# Patient Record
Sex: MALE | Race: ASIAN | NOT HISPANIC OR LATINO
[De-identification: names, ages, dates, MRNs, and addresses within clinical notes are randomized per-mention and may not be internally consistent; named-entity substitution may affect disease eponyms.]

---

## 2017-06-19 ENCOUNTER — FORM ENCOUNTER (OUTPATIENT)
Age: 37
End: 2017-06-19

## 2017-06-20 ENCOUNTER — LABORATORY RESULT (OUTPATIENT)
Age: 37
End: 2017-06-20

## 2017-06-20 ENCOUNTER — APPOINTMENT (OUTPATIENT)
Dept: ULTRASOUND IMAGING | Facility: CLINIC | Age: 37
End: 2017-06-20

## 2017-06-20 ENCOUNTER — TRANSCRIPTION ENCOUNTER (OUTPATIENT)
Age: 37
End: 2017-06-20

## 2017-06-20 ENCOUNTER — OUTPATIENT (OUTPATIENT)
Dept: OUTPATIENT SERVICES | Facility: HOSPITAL | Age: 37
LOS: 1 days | End: 2017-06-20
Payer: COMMERCIAL

## 2017-06-20 DIAGNOSIS — B18.1 CHRONIC VIRAL HEPATITIS B WITHOUT DELTA-AGENT: ICD-10-CM

## 2017-06-20 PROCEDURE — 76700 US EXAM ABDOM COMPLETE: CPT

## 2017-06-26 ENCOUNTER — APPOINTMENT (OUTPATIENT)
Age: 37
End: 2017-06-26

## 2017-06-26 VITALS
DIASTOLIC BLOOD PRESSURE: 87 MMHG | HEART RATE: 74 BPM | TEMPERATURE: 97.7 F | WEIGHT: 196 LBS | BODY MASS INDEX: 37 KG/M2 | SYSTOLIC BLOOD PRESSURE: 138 MMHG | RESPIRATION RATE: 17 BRPM | HEIGHT: 61 IN

## 2017-06-27 LAB
HBV DNA # SERPL NAA+PROBE: 23 IU/ML
HEPB DNA PCR LOG: 1.36

## 2017-12-20 LAB
ALBUMIN SERPL ELPH-MCNC: 4 G/DL
ALP BLD-CCNC: 51 U/L
ALT SERPL-CCNC: 27 U/L
ANION GAP SERPL CALC-SCNC: 16 MMOL/L
AST SERPL-CCNC: 23 U/L
BASOPHILS # BLD AUTO: 0.03 K/UL
BASOPHILS NFR BLD AUTO: 0.4 %
BILIRUB SERPL-MCNC: 0.2 MG/DL
BUN SERPL-MCNC: 15 MG/DL
CALCIUM SERPL-MCNC: 9.2 MG/DL
CHLORIDE SERPL-SCNC: 99 MMOL/L
CO2 SERPL-SCNC: 21 MMOL/L
CREAT SERPL-MCNC: 0.96 MG/DL
EOSINOPHIL # BLD AUTO: 0.17 K/UL
EOSINOPHIL NFR BLD AUTO: 2.2 %
HBV DNA # SERPL NAA+PROBE: NOT DETECTED
HBV SURFACE AB SER QL: NONREACTIVE
HBV SURFACE AG SER QL: REACTIVE
HCT VFR BLD CALC: 42.9 %
HEPB DNA PCR LOG: NOT DETECTED LOGIU/ML
HGB BLD-MCNC: 14.6 G/DL
IMM GRANULOCYTES NFR BLD AUTO: 0.1 %
LYMPHOCYTES # BLD AUTO: 2.89 K/UL
LYMPHOCYTES NFR BLD AUTO: 36.7 %
MAN DIFF?: NORMAL
MCHC RBC-ENTMCNC: 31.1 PG
MCHC RBC-ENTMCNC: 34 GM/DL
MCV RBC AUTO: 91.5 FL
MONOCYTES # BLD AUTO: 0.44 K/UL
MONOCYTES NFR BLD AUTO: 5.6 %
NEUTROPHILS # BLD AUTO: 4.33 K/UL
NEUTROPHILS NFR BLD AUTO: 55 %
PLATELET # BLD AUTO: 225 K/UL
POTASSIUM SERPL-SCNC: 4.2 MMOL/L
PROT SERPL-MCNC: 7.3 G/DL
RBC # BLD: 4.69 M/UL
RBC # FLD: 12.5 %
SODIUM SERPL-SCNC: 136 MMOL/L
WBC # FLD AUTO: 7.87 K/UL

## 2017-12-22 ENCOUNTER — FORM ENCOUNTER (OUTPATIENT)
Age: 37
End: 2017-12-22

## 2017-12-23 ENCOUNTER — OUTPATIENT (OUTPATIENT)
Dept: OUTPATIENT SERVICES | Facility: HOSPITAL | Age: 37
LOS: 1 days | End: 2017-12-23
Payer: COMMERCIAL

## 2017-12-23 ENCOUNTER — APPOINTMENT (OUTPATIENT)
Dept: ULTRASOUND IMAGING | Facility: CLINIC | Age: 37
End: 2017-12-23

## 2017-12-23 DIAGNOSIS — B18.1 CHRONIC VIRAL HEPATITIS B WITHOUT DELTA-AGENT: ICD-10-CM

## 2017-12-23 PROCEDURE — 76700 US EXAM ABDOM COMPLETE: CPT

## 2017-12-23 PROCEDURE — 76700 US EXAM ABDOM COMPLETE: CPT | Mod: 26

## 2018-01-08 ENCOUNTER — APPOINTMENT (OUTPATIENT)
Age: 38
End: 2018-01-08
Payer: COMMERCIAL

## 2018-01-08 VITALS
TEMPERATURE: 97.5 F | HEIGHT: 71 IN | SYSTOLIC BLOOD PRESSURE: 141 MMHG | WEIGHT: 196 LBS | DIASTOLIC BLOOD PRESSURE: 84 MMHG | RESPIRATION RATE: 16 BRPM | BODY MASS INDEX: 27.44 KG/M2 | HEART RATE: 81 BPM

## 2018-01-08 PROCEDURE — 99214 OFFICE O/P EST MOD 30 MIN: CPT

## 2018-01-18 ENCOUNTER — RX RENEWAL (OUTPATIENT)
Age: 38
End: 2018-01-18

## 2018-12-17 ENCOUNTER — MEDICATION RENEWAL (OUTPATIENT)
Age: 38
End: 2018-12-17

## 2019-01-06 ENCOUNTER — FORM ENCOUNTER (OUTPATIENT)
Age: 39
End: 2019-01-06

## 2019-01-07 ENCOUNTER — APPOINTMENT (OUTPATIENT)
Dept: ULTRASOUND IMAGING | Facility: CLINIC | Age: 39
End: 2019-01-07
Payer: COMMERCIAL

## 2019-01-07 ENCOUNTER — OUTPATIENT (OUTPATIENT)
Dept: OUTPATIENT SERVICES | Facility: HOSPITAL | Age: 39
LOS: 1 days | End: 2019-01-07
Payer: COMMERCIAL

## 2019-01-07 DIAGNOSIS — B18.1 CHRONIC VIRAL HEPATITIS B WITHOUT DELTA-AGENT: ICD-10-CM

## 2019-01-07 PROCEDURE — 76700 US EXAM ABDOM COMPLETE: CPT

## 2019-01-07 PROCEDURE — 76700 US EXAM ABDOM COMPLETE: CPT | Mod: 26

## 2019-01-10 LAB
AFP-TM SERPL-MCNC: 2.6 NG/ML
ALBUMIN SERPL ELPH-MCNC: 4.5 G/DL
ALP BLD-CCNC: 53 U/L
ALT SERPL-CCNC: 40 U/L
ANION GAP SERPL CALC-SCNC: 15 MMOL/L
AST SERPL-CCNC: 26 U/L
BASOPHILS # BLD AUTO: 0.03 K/UL
BASOPHILS NFR BLD AUTO: 0.4 %
BILIRUB SERPL-MCNC: 0.3 MG/DL
BUN SERPL-MCNC: 15 MG/DL
CALCIUM SERPL-MCNC: 9.5 MG/DL
CHLORIDE SERPL-SCNC: 100 MMOL/L
CO2 SERPL-SCNC: 23 MMOL/L
CREAT SERPL-MCNC: 1.04 MG/DL
EOSINOPHIL # BLD AUTO: 0.15 K/UL
EOSINOPHIL NFR BLD AUTO: 2 %
HBV SURFACE AB SER QL: NONREACTIVE
HBV SURFACE AG SER QL: REACTIVE
HCT VFR BLD CALC: 44 %
HGB BLD-MCNC: 14.7 G/DL
IMM GRANULOCYTES NFR BLD AUTO: 0.3 %
LYMPHOCYTES # BLD AUTO: 3.44 K/UL
LYMPHOCYTES NFR BLD AUTO: 45.3 %
MAN DIFF?: NORMAL
MCHC RBC-ENTMCNC: 31 PG
MCHC RBC-ENTMCNC: 33.4 GM/DL
MCV RBC AUTO: 92.8 FL
MONOCYTES # BLD AUTO: 0.44 K/UL
MONOCYTES NFR BLD AUTO: 5.8 %
NEUTROPHILS # BLD AUTO: 3.51 K/UL
NEUTROPHILS NFR BLD AUTO: 46.2 %
PLATELET # BLD AUTO: 260 K/UL
POTASSIUM SERPL-SCNC: 4.2 MMOL/L
PROT SERPL-MCNC: 7 G/DL
RBC # BLD: 4.74 M/UL
RBC # FLD: 12.2 %
SODIUM SERPL-SCNC: 138 MMOL/L
WBC # FLD AUTO: 7.59 K/UL

## 2019-01-11 LAB
HBV DNA # SERPL NAA+PROBE: NOT DETECTED IU/ML
HEPB DNA PCR LOG: NOT DETECTED LOGIU/ML

## 2019-01-23 ENCOUNTER — APPOINTMENT (OUTPATIENT)
Dept: HEPATOLOGY | Facility: CLINIC | Age: 39
End: 2019-01-23
Payer: COMMERCIAL

## 2019-01-23 VITALS
RESPIRATION RATE: 19 BRPM | BODY MASS INDEX: 27.44 KG/M2 | HEIGHT: 71 IN | HEART RATE: 79 BPM | WEIGHT: 196 LBS | SYSTOLIC BLOOD PRESSURE: 138 MMHG | DIASTOLIC BLOOD PRESSURE: 79 MMHG | TEMPERATURE: 97.8 F

## 2019-01-23 PROCEDURE — 99214 OFFICE O/P EST MOD 30 MIN: CPT

## 2019-07-15 ENCOUNTER — FORM ENCOUNTER (OUTPATIENT)
Age: 39
End: 2019-07-15

## 2019-07-16 ENCOUNTER — APPOINTMENT (OUTPATIENT)
Dept: ULTRASOUND IMAGING | Facility: CLINIC | Age: 39
End: 2019-07-16
Payer: COMMERCIAL

## 2019-07-16 ENCOUNTER — OUTPATIENT (OUTPATIENT)
Dept: OUTPATIENT SERVICES | Facility: HOSPITAL | Age: 39
LOS: 1 days | End: 2019-07-16
Payer: COMMERCIAL

## 2019-07-16 DIAGNOSIS — B18.1 CHRONIC VIRAL HEPATITIS B WITHOUT DELTA-AGENT: ICD-10-CM

## 2019-07-16 LAB
ALBUMIN SERPL ELPH-MCNC: 4.8 G/DL
ALP BLD-CCNC: 57 U/L
ALT SERPL-CCNC: 30 U/L
ANION GAP SERPL CALC-SCNC: 10 MMOL/L
AST SERPL-CCNC: 19 U/L
BASOPHILS # BLD AUTO: 0.05 K/UL
BASOPHILS NFR BLD AUTO: 0.8 %
BILIRUB SERPL-MCNC: 0.5 MG/DL
BUN SERPL-MCNC: 14 MG/DL
CALCIUM SERPL-MCNC: 10 MG/DL
CHLORIDE SERPL-SCNC: 102 MMOL/L
CO2 SERPL-SCNC: 27 MMOL/L
CREAT SERPL-MCNC: 1.08 MG/DL
EOSINOPHIL # BLD AUTO: 0.26 K/UL
EOSINOPHIL NFR BLD AUTO: 4.3 %
HCT VFR BLD CALC: 47.6 %
HGB BLD-MCNC: 15.9 G/DL
IMM GRANULOCYTES NFR BLD AUTO: 0.3 %
LYMPHOCYTES # BLD AUTO: 2.69 K/UL
LYMPHOCYTES NFR BLD AUTO: 44.7 %
MAN DIFF?: NORMAL
MCHC RBC-ENTMCNC: 30.9 PG
MCHC RBC-ENTMCNC: 33.4 GM/DL
MCV RBC AUTO: 92.6 FL
MONOCYTES # BLD AUTO: 0.44 K/UL
MONOCYTES NFR BLD AUTO: 7.3 %
NEUTROPHILS # BLD AUTO: 2.56 K/UL
NEUTROPHILS NFR BLD AUTO: 42.6 %
PLATELET # BLD AUTO: 230 K/UL
POTASSIUM SERPL-SCNC: 4.6 MMOL/L
PROT SERPL-MCNC: 7.4 G/DL
RBC # BLD: 5.14 M/UL
RBC # FLD: 11.9 %
SODIUM SERPL-SCNC: 139 MMOL/L
WBC # FLD AUTO: 6.02 K/UL

## 2019-07-16 PROCEDURE — 76700 US EXAM ABDOM COMPLETE: CPT

## 2019-07-16 PROCEDURE — 76700 US EXAM ABDOM COMPLETE: CPT | Mod: 26

## 2019-07-17 LAB
HBV SURFACE AB SER QL: NONREACTIVE
HBV SURFACE AG SER QL: REACTIVE

## 2019-07-18 LAB
HBV DNA # SERPL NAA+PROBE: NOT DETECTED IU/ML
HEPB DNA PCR LOG: NOT DETECTED LOGIU/ML

## 2019-07-22 ENCOUNTER — APPOINTMENT (OUTPATIENT)
Dept: HEPATOLOGY | Facility: CLINIC | Age: 39
End: 2019-07-22
Payer: COMMERCIAL

## 2019-07-22 VITALS
HEIGHT: 71 IN | BODY MASS INDEX: 27.3 KG/M2 | RESPIRATION RATE: 19 BRPM | DIASTOLIC BLOOD PRESSURE: 80 MMHG | HEART RATE: 86 BPM | TEMPERATURE: 98 F | SYSTOLIC BLOOD PRESSURE: 116 MMHG | WEIGHT: 195 LBS

## 2019-07-22 VITALS
HEIGHT: 71 IN | SYSTOLIC BLOOD PRESSURE: 116 MMHG | BODY MASS INDEX: 27.3 KG/M2 | TEMPERATURE: 98 F | RESPIRATION RATE: 19 BRPM | WEIGHT: 195 LBS | DIASTOLIC BLOOD PRESSURE: 80 MMHG | HEART RATE: 86 BPM

## 2019-07-22 PROCEDURE — 99214 OFFICE O/P EST MOD 30 MIN: CPT | Mod: 25

## 2019-07-22 PROCEDURE — 91200 LIVER ELASTOGRAPHY: CPT

## 2019-07-22 PROCEDURE — ZZZZZ: CPT

## 2020-02-13 ENCOUNTER — APPOINTMENT (OUTPATIENT)
Dept: HEPATOLOGY | Facility: CLINIC | Age: 40
End: 2020-02-13

## 2021-02-04 ENCOUNTER — OUTPATIENT (OUTPATIENT)
Dept: OUTPATIENT SERVICES | Facility: HOSPITAL | Age: 41
LOS: 1 days | End: 2021-02-04
Payer: COMMERCIAL

## 2021-02-04 ENCOUNTER — APPOINTMENT (OUTPATIENT)
Dept: ULTRASOUND IMAGING | Facility: CLINIC | Age: 41
End: 2021-02-04
Payer: COMMERCIAL

## 2021-02-04 DIAGNOSIS — B19.10 UNSPECIFIED VIRAL HEPATITIS B WITHOUT HEPATIC COMA: ICD-10-CM

## 2021-02-04 PROCEDURE — 76700 US EXAM ABDOM COMPLETE: CPT | Mod: 26

## 2021-02-04 PROCEDURE — 76700 US EXAM ABDOM COMPLETE: CPT

## 2021-02-09 LAB
AFP-TM SERPL-MCNC: 2.1 NG/ML
ALBUMIN SERPL ELPH-MCNC: 4.6 G/DL
ALP BLD-CCNC: 64 U/L
ALT SERPL-CCNC: 32 U/L
ANION GAP SERPL CALC-SCNC: 12 MMOL/L
AST SERPL-CCNC: 23 U/L
BASOPHILS # BLD AUTO: 0.05 K/UL
BASOPHILS NFR BLD AUTO: 0.8 %
BILIRUB SERPL-MCNC: 0.3 MG/DL
BUN SERPL-MCNC: 14 MG/DL
CALCIUM SERPL-MCNC: 9.9 MG/DL
CHLORIDE SERPL-SCNC: 101 MMOL/L
CO2 SERPL-SCNC: 25 MMOL/L
CREAT SERPL-MCNC: 1 MG/DL
EOSINOPHIL # BLD AUTO: 0.1 K/UL
EOSINOPHIL NFR BLD AUTO: 1.5 %
HBV SURFACE AB SER QL: NONREACTIVE
HBV SURFACE AG SER QL: REACTIVE
HCT VFR BLD CALC: 46.1 %
HGB BLD-MCNC: 15.7 G/DL
IMM GRANULOCYTES NFR BLD AUTO: 0.2 %
LYMPHOCYTES # BLD AUTO: 2.75 K/UL
LYMPHOCYTES NFR BLD AUTO: 42.3 %
MAN DIFF?: NORMAL
MCHC RBC-ENTMCNC: 30.9 PG
MCHC RBC-ENTMCNC: 34.1 GM/DL
MCV RBC AUTO: 90.7 FL
MONOCYTES # BLD AUTO: 0.49 K/UL
MONOCYTES NFR BLD AUTO: 7.5 %
NEUTROPHILS # BLD AUTO: 3.1 K/UL
NEUTROPHILS NFR BLD AUTO: 47.7 %
PLATELET # BLD AUTO: 252 K/UL
POTASSIUM SERPL-SCNC: 4 MMOL/L
PROT SERPL-MCNC: 7.4 G/DL
RBC # BLD: 5.08 M/UL
RBC # FLD: 11.8 %
SODIUM SERPL-SCNC: 138 MMOL/L
WBC # FLD AUTO: 6.5 K/UL

## 2021-02-10 LAB
HBV DNA # SERPL NAA+PROBE: NORMAL IU/ML
HBV E AB SER QL: NEGATIVE
HEPB DNA PCR LOG: ABNORMAL LOG10IU/ML

## 2021-02-11 LAB — HBV E AG SER QL: POSITIVE

## 2021-02-12 ENCOUNTER — APPOINTMENT (OUTPATIENT)
Dept: HEPATOLOGY | Facility: CLINIC | Age: 41
End: 2021-02-12
Payer: COMMERCIAL

## 2021-02-12 VITALS
TEMPERATURE: 97 F | OXYGEN SATURATION: 100 % | HEIGHT: 71 IN | WEIGHT: 192 LBS | RESPIRATION RATE: 12 BRPM | BODY MASS INDEX: 26.88 KG/M2 | DIASTOLIC BLOOD PRESSURE: 71 MMHG | SYSTOLIC BLOOD PRESSURE: 103 MMHG | HEART RATE: 80 BPM

## 2021-02-12 PROCEDURE — 99072 ADDL SUPL MATRL&STAF TM PHE: CPT

## 2021-02-12 PROCEDURE — 99214 OFFICE O/P EST MOD 30 MIN: CPT

## 2021-07-19 ENCOUNTER — TRANSCRIPTION ENCOUNTER (OUTPATIENT)
Age: 41
End: 2021-07-19

## 2021-09-03 ENCOUNTER — APPOINTMENT (OUTPATIENT)
Dept: ULTRASOUND IMAGING | Facility: CLINIC | Age: 41
End: 2021-09-03
Payer: COMMERCIAL

## 2021-09-03 ENCOUNTER — OUTPATIENT (OUTPATIENT)
Dept: OUTPATIENT SERVICES | Facility: HOSPITAL | Age: 41
LOS: 1 days | End: 2021-09-03
Payer: COMMERCIAL

## 2021-09-03 DIAGNOSIS — B18.1 CHRONIC VIRAL HEPATITIS B WITHOUT DELTA-AGENT: ICD-10-CM

## 2021-09-03 DIAGNOSIS — Z00.8 ENCOUNTER FOR OTHER GENERAL EXAMINATION: ICD-10-CM

## 2021-09-03 PROCEDURE — 76700 US EXAM ABDOM COMPLETE: CPT | Mod: 26

## 2021-09-03 PROCEDURE — 76700 US EXAM ABDOM COMPLETE: CPT

## 2021-09-04 LAB
AFP-TM SERPL-MCNC: 1.8 NG/ML
ALBUMIN SERPL ELPH-MCNC: 4.7 G/DL
ALP BLD-CCNC: 66 U/L
ALT SERPL-CCNC: 32 U/L
ANION GAP SERPL CALC-SCNC: 13 MMOL/L
AST SERPL-CCNC: 24 U/L
BASOPHILS # BLD AUTO: 0.05 K/UL
BASOPHILS NFR BLD AUTO: 0.6 %
BILIRUB SERPL-MCNC: 0.6 MG/DL
BUN SERPL-MCNC: 15 MG/DL
CALCIUM SERPL-MCNC: 9.8 MG/DL
CHLORIDE SERPL-SCNC: 102 MMOL/L
CO2 SERPL-SCNC: 24 MMOL/L
CREAT SERPL-MCNC: 1.01 MG/DL
EOSINOPHIL # BLD AUTO: 0.28 K/UL
EOSINOPHIL NFR BLD AUTO: 3.6 %
HBV CORE IGG+IGM SER QL: REACTIVE
HBV E AB SER QL: NONREACTIVE
HBV E AG SER QL: REACTIVE
HBV SURFACE AB SER QL: NONREACTIVE
HBV SURFACE AG SER QL: REACTIVE
HCT VFR BLD CALC: 47.2 %
HGB BLD-MCNC: 15.9 G/DL
IMM GRANULOCYTES NFR BLD AUTO: 0.3 %
LYMPHOCYTES # BLD AUTO: 3.19 K/UL
LYMPHOCYTES NFR BLD AUTO: 40.6 %
MAN DIFF?: NORMAL
MCHC RBC-ENTMCNC: 30.9 PG
MCHC RBC-ENTMCNC: 33.7 GM/DL
MCV RBC AUTO: 91.7 FL
MONOCYTES # BLD AUTO: 0.66 K/UL
MONOCYTES NFR BLD AUTO: 8.4 %
NEUTROPHILS # BLD AUTO: 3.65 K/UL
NEUTROPHILS NFR BLD AUTO: 46.5 %
PLATELET # BLD AUTO: 220 K/UL
POTASSIUM SERPL-SCNC: 4.5 MMOL/L
PROT SERPL-MCNC: 7.5 G/DL
RBC # BLD: 5.15 M/UL
RBC # FLD: 12.1 %
SODIUM SERPL-SCNC: 139 MMOL/L
WBC # FLD AUTO: 7.85 K/UL

## 2021-09-07 LAB
HBV DNA # SERPL NAA+PROBE: NOT DETECTED
HEPB DNA PCR LOG: NOT DETECTED LOG10IU/ML

## 2021-09-15 ENCOUNTER — APPOINTMENT (OUTPATIENT)
Dept: HEPATOLOGY | Facility: CLINIC | Age: 41
End: 2021-09-15
Payer: COMMERCIAL

## 2021-09-15 VITALS
RESPIRATION RATE: 12 BRPM | WEIGHT: 190 LBS | SYSTOLIC BLOOD PRESSURE: 126 MMHG | HEART RATE: 74 BPM | DIASTOLIC BLOOD PRESSURE: 79 MMHG | HEIGHT: 71 IN | TEMPERATURE: 97.9 F | BODY MASS INDEX: 26.6 KG/M2

## 2021-09-15 PROCEDURE — 99214 OFFICE O/P EST MOD 30 MIN: CPT

## 2021-09-15 PROCEDURE — 91200 LIVER ELASTOGRAPHY: CPT

## 2022-03-02 ENCOUNTER — RX RENEWAL (OUTPATIENT)
Age: 42
End: 2022-03-02

## 2022-05-05 ENCOUNTER — APPOINTMENT (OUTPATIENT)
Dept: HEPATOLOGY | Facility: CLINIC | Age: 42
End: 2022-05-05

## 2022-06-02 ENCOUNTER — RX RENEWAL (OUTPATIENT)
Age: 42
End: 2022-06-02

## 2022-09-30 ENCOUNTER — APPOINTMENT (OUTPATIENT)
Dept: HEPATOLOGY | Facility: CLINIC | Age: 42
End: 2022-09-30

## 2022-09-30 VITALS
TEMPERATURE: 98 F | WEIGHT: 195 LBS | RESPIRATION RATE: 14 BRPM | SYSTOLIC BLOOD PRESSURE: 112 MMHG | BODY MASS INDEX: 27.61 KG/M2 | DIASTOLIC BLOOD PRESSURE: 77 MMHG | HEIGHT: 70.5 IN | HEART RATE: 81 BPM | OXYGEN SATURATION: 97 %

## 2022-09-30 DIAGNOSIS — Z78.9 OTHER SPECIFIED HEALTH STATUS: ICD-10-CM

## 2022-09-30 LAB
ALBUMIN SERPL ELPH-MCNC: 4.9 G/DL
ALP BLD-CCNC: 66 U/L
ALT SERPL-CCNC: 30 U/L
ANION GAP SERPL CALC-SCNC: 11 MMOL/L
AST SERPL-CCNC: 21 U/L
BILIRUB SERPL-MCNC: 0.6 MG/DL
BUN SERPL-MCNC: 14 MG/DL
CALCIUM SERPL-MCNC: 10.5 MG/DL
CHLORIDE SERPL-SCNC: 104 MMOL/L
CO2 SERPL-SCNC: 25 MMOL/L
CREAT SERPL-MCNC: 0.97 MG/DL
EGFR: 99 ML/MIN/1.73M2
GLUCOSE SERPL-MCNC: 98 MG/DL
POTASSIUM SERPL-SCNC: 4.8 MMOL/L
PROT SERPL-MCNC: 7.7 G/DL
SODIUM SERPL-SCNC: 140 MMOL/L

## 2022-09-30 PROCEDURE — 99214 OFFICE O/P EST MOD 30 MIN: CPT

## 2022-09-30 RX ORDER — SODIUM SULFATE, POTASSIUM SULFATE, MAGNESIUM SULFATE 17.5; 3.13; 1.6 G/ML; G/ML; G/ML
17.5-3.13-1.6 SOLUTION, CONCENTRATE ORAL
Qty: 1 | Refills: 0 | Status: DISCONTINUED | COMMUNITY
Start: 2021-09-15 | End: 2022-09-30

## 2022-09-30 NOTE — HISTORY OF PRESENT ILLNESS
[de-identified] : - 9/30/22: first visit with me. Had US. Feels well, is taking Vemlidy. Is otherwise healthy. Had red blood on toilet paper before last visit, 2 or 3 times, while stool was brown. Felt anal swelling. Resolved. Weight stable. Did not have constipation\par \par SHx: acounting in a bank\par \par Physical exam:\par Gen: A&Ox3, NAD\par HEENT: normal outer ears & nose, PERRL, mucus membranes moist, anicteric, no lymphadenopathy\par CVS: regular rate and rhythm, no murmur\par Pulm: vesicular breath sounds\par Abdomen: normal bowel sounds, soft, nontender, no hepatosplenomegaly. Recum: small external hemorrhoids.ELKIN: normal prostate, no anal mass\par Legs: no edema, no clubbing\par Musculoskeletal: normal gait\par Skin: no rash\par Neuro: no asterixis, EOMI\par Psych: normal affect\par \par \par Workup:\par - 9/07/22 US abdomen: normal exam\par \par Prior notes by Dr. Herndon: Mr Dunlap comes in today for followup. He has a history of hepatitis B and is doing well on Vemlidy 25 mg a day   His weight is stable.  He has no abdominal pain.  He has no pruritus.  He does report 2 episodes of bright red blood per rectum with bloody stool over the past month.  His last bowel movement was normal\par \par FibroScan 9/15/2021 F 0-1, S2-3\par \par Fibroscan July 22, 2019 with F2, S2 disease\par A fibroscan performed on June 26, 2017 showed F1, S2 disease.\par \par A fibroscan performed on 7/9/2015 showed F1 disease.\par \par Blood tests 9/3/2021 platelet count 220,000, ALT 32, AST 24, total bilirubin 0.6, creatinine 1.01, alpha-fetoprotein 1.8, HBV DNA not detected, hepatitis B E antigen positive.  Abdominal sonogram 9/3/2021 with no lesions in the liver\par \par Blood tests February 8, 2021 platelet count 252,000, alpha-fetoprotein 2.1, creatinine 1, ALT 32, AST 23, HBV DNA detected less than 15, abdominal sonogram February 4, 2021 with hepatic steatosis and no liver lesions\par \par Blood tests July 16 2019 ALT 30, AST 19, total bilirubin 0.5, creatinine 1.08, HBV DNA he not detected, platelet count 230,000. Abdominal sonogram July 16, 2019 with no lesions in the liver\par \par Blood tests January 9, 2019 ALT 40, AST 26, creatinine 1.04, alpha-fetoprotein 2.6, platelet count 260,000, HBV DNA he not detected. Abdominal sonogram January 7, 2019 without lesions in the liver\par \par Blood tests December 19, 2017 ALT 27, AST 23, total bilirubin 0.2, creatinine 0.96, CBC with platelets unremarkable, HBV DNA not detected. Abdominal sonogram December 23, 2017 without lesions in the liver\par \par Blood tests June 20, 2017 CBC with platelets unremarkable, alpha-fetoprotein 2.7, ALT 41, AST 34, creatinine 1.27, HBV DNA ordered but not done\par \par Blood tests December 7, 2016 alpha-fetoprotein 2.1, ALT 35, creatinine 1.17, platelet count 222,000, hepatitis B surface antigen positive, HBV DNA not detected.\par \par An abdominal sonogram December 6, 2016 showed no lesions in the liver\par \par Sonogram June 17, 2016 shows no lesions in the liver. Blood tests June 17 2016 normal CBC, ALT 27, creatinine 0.93, and HBVDNA less than 15 and detected\par \par Sonogram November 13, 2015 shows no lesions in the liver. Blood tests December 13, 2015 showing normal CBC, creatinine 0.97, ALT 25, AST 21, alkaline phosphatase 73, HBV DNA less than 15 and detected, hepatitis B surface antigen positive\par \par Blood tests April 29 2015 HBV DNA not detected, hepatitis B E antigen positive, a.l. 329, creatinine 1.06,  CBC with platelets unremarkable \par \par Sonogram April 30, 2015 shows no lesions in the liver\par \par Sonogram October 13, 2014 reveals hepatomegaly and no lesions. Blood test September 27, 2014 HBV DNA not detected, E antigen positive, alpha-fetoprotein 1.8, ALT 45, creatinine 0.3, platelet count [id number]\par \par blood tests from April 18, 2014 show an ALT of 42, creatinine 0.87, platelet count 229,000, hemoglobin 14.9, alpha-fetoprotein 1.9, HBV DNA less than 15 and detectable. An abdominal sonogram from April 18, 2014 showed no lesions in the liver

## 2022-09-30 NOTE — ASSESSMENT
[FreeTextEntry1] : Mr. HONEYCUTT is a 41 year old man with chronic hepatitis B, who saw Dr. Herndon until 2022.\par \par - hepatitis B: on tenofovir since ~2010, LFTs were reportedly elevated, switched to tenofovir TAF in 2021. \par \par - transient rectal bleed, likely hemorrhoidal in 2021. Has hemorrhoids on exam, no rectal mass.\par \par - BMI elevated purely due to muscles\par \par Plan:\par - repeat bloodwork and US before next visit in 6 months\par - bloodwork today\par - hemorrhoids: keep stool soft

## 2022-10-03 LAB
HBV E AB SER QL: NONREACTIVE
HBV SURFACE AB SER QL: NONREACTIVE
HEPB DNA PCR INT: NOT DETECTED
HEPB DNA PCR LOG: NOT DETECTED LOGIU/ML

## 2023-03-20 ENCOUNTER — APPOINTMENT (OUTPATIENT)
Dept: ULTRASOUND IMAGING | Facility: CLINIC | Age: 43
End: 2023-03-20
Payer: COMMERCIAL

## 2023-03-20 ENCOUNTER — OUTPATIENT (OUTPATIENT)
Dept: OUTPATIENT SERVICES | Facility: HOSPITAL | Age: 43
LOS: 1 days | End: 2023-03-20
Payer: COMMERCIAL

## 2023-03-20 DIAGNOSIS — Z00.8 ENCOUNTER FOR OTHER GENERAL EXAMINATION: ICD-10-CM

## 2023-03-20 DIAGNOSIS — B18.1 CHRONIC VIRAL HEPATITIS B WITHOUT DELTA-AGENT: ICD-10-CM

## 2023-03-20 PROCEDURE — 76700 US EXAM ABDOM COMPLETE: CPT

## 2023-03-20 PROCEDURE — 76700 US EXAM ABDOM COMPLETE: CPT | Mod: 26

## 2023-03-23 LAB
AFP-TM SERPL-MCNC: <1.8 NG/ML
ALBUMIN SERPL ELPH-MCNC: 4.5 G/DL
ALP BLD-CCNC: 64 U/L
ALT SERPL-CCNC: 22 U/L
ANION GAP SERPL CALC-SCNC: 14 MMOL/L
AST SERPL-CCNC: 16 U/L
BILIRUB SERPL-MCNC: 0.4 MG/DL
BUN SERPL-MCNC: 18 MG/DL
CALCIUM SERPL-MCNC: 9.7 MG/DL
CHLORIDE SERPL-SCNC: 100 MMOL/L
CO2 SERPL-SCNC: 21 MMOL/L
CREAT SERPL-MCNC: 1.01 MG/DL
EGFR: 94 ML/MIN/1.73M2
GLUCOSE SERPL-MCNC: 177 MG/DL
POTASSIUM SERPL-SCNC: 4.2 MMOL/L
PROT SERPL-MCNC: 6.9 G/DL
SODIUM SERPL-SCNC: 135 MMOL/L

## 2023-03-24 LAB
HEPB DNA PCR INT: NOT DETECTED
HEPB DNA PCR LOG: NOT DETECTED LOGIU/ML

## 2023-03-29 ENCOUNTER — APPOINTMENT (OUTPATIENT)
Dept: HEPATOLOGY | Facility: CLINIC | Age: 43
End: 2023-03-29
Payer: COMMERCIAL

## 2023-03-29 VITALS
BODY MASS INDEX: 27.89 KG/M2 | HEIGHT: 70.5 IN | DIASTOLIC BLOOD PRESSURE: 85 MMHG | RESPIRATION RATE: 14 BRPM | HEART RATE: 82 BPM | WEIGHT: 197 LBS | OXYGEN SATURATION: 99 % | SYSTOLIC BLOOD PRESSURE: 134 MMHG | TEMPERATURE: 98.1 F

## 2023-03-29 PROCEDURE — 99214 OFFICE O/P EST MOD 30 MIN: CPT

## 2023-03-29 NOTE — HISTORY OF PRESENT ILLNESS
[de-identified] : - 3/29/23: had bloodwork and US abdomen. Weight stable. Continues to have red blood on toilet paper on and off. \par \par - 9/30/22: first visit with me. Had US. Feels well, is taking Vemlidy. Is otherwise healthy. Had red blood on toilet paper before last visit, 2 or 3 times, while stool was brown. Felt anal swelling. Resolved. Weight stable. Did not have constipation\par \par SHx: acounting in a bank\par \par Physical exam:\par Gen: A&Ox3, NAD\par HEENT: normal outer ears & nose, PERRL, mucus membranes moist, anicteric, no lymphadenopathy\par CVS: regular rate and rhythm, no murmur\par Pulm: vesicular breath sounds\par Abdomen: normal bowel sounds, soft, nontender, no hepatosplenomegaly. Recum: small external hemorrhoids.ELKIN: normal prostate, no anal mass\par Legs: no edema, no clubbing\par Musculoskeletal: normal gait\par Skin: no rash\par Neuro: no asterixis, EOMI\par Psych: normal affect\par \par \par Workup:\par - 3/20/23 US abdomen: normal exam. \par - 9/07/22 US abdomen: normal exam\par - 9/15/21 fibroscan: 6.4 kPa, 265 dB/m - F0-1, S2-3. No/mild fibrosis; steatosis\par - 6/26/17 fibroscan: 5.3 kPa ,269 dB/m - F1, S2\par - 7/9/15 fibroscan: F1 fibrosis\par \par Prior notes by Dr. Herndon: Mr Dunlap comes in today for followup. He has a history of hepatitis B and is doing well on Vemlidy 25 mg a day   His weight is stable.  He has no abdominal pain.  He has no pruritus.  He does report 2 episodes of bright red blood per rectum with bloody stool over the past month.  His last bowel movement was normal\par \par FibroScan 9/15/2021 F 0-1, S2-3\par \par Fibroscan July 22, 2019 with F2, S2 disease\par A fibroscan performed on June 26, 2017 showed F1, S2 disease.\par \par A fibroscan performed on 7/9/2015 showed F1 disease.\par \par Blood tests 9/3/2021 platelet count 220,000, ALT 32, AST 24, total bilirubin 0.6, creatinine 1.01, alpha-fetoprotein 1.8, HBV DNA not detected, hepatitis B E antigen positive.  Abdominal sonogram 9/3/2021 with no lesions in the liver\par \par Blood tests February 8, 2021 platelet count 252,000, alpha-fetoprotein 2.1, creatinine 1, ALT 32, AST 23, HBV DNA detected less than 15, abdominal sonogram February 4, 2021 with hepatic steatosis and no liver lesions\par \par Blood tests July 16 2019 ALT 30, AST 19, total bilirubin 0.5, creatinine 1.08, HBV DNA he not detected, platelet count 230,000. Abdominal sonogram July 16, 2019 with no lesions in the liver\par \par Blood tests January 9, 2019 ALT 40, AST 26, creatinine 1.04, alpha-fetoprotein 2.6, platelet count 260,000, HBV DNA he not detected. Abdominal sonogram January 7, 2019 without lesions in the liver\par \par Blood tests December 19, 2017 ALT 27, AST 23, total bilirubin 0.2, creatinine 0.96, CBC with platelets unremarkable, HBV DNA not detected. Abdominal sonogram December 23, 2017 without lesions in the liver\par \par Blood tests June 20, 2017 CBC with platelets unremarkable, alpha-fetoprotein 2.7, ALT 41, AST 34, creatinine 1.27, HBV DNA ordered but not done\par \par Blood tests December 7, 2016 alpha-fetoprotein 2.1, ALT 35, creatinine 1.17, platelet count 222,000, hepatitis B surface antigen positive, HBV DNA not detected.\par \par An abdominal sonogram December 6, 2016 showed no lesions in the liver\par \par Sonogram June 17, 2016 shows no lesions in the liver. Blood tests June 17 2016 normal CBC, ALT 27, creatinine 0.93, and HBVDNA less than 15 and detected\par \par Sonogram November 13, 2015 shows no lesions in the liver. Blood tests December 13, 2015 showing normal CBC, creatinine 0.97, ALT 25, AST 21, alkaline phosphatase 73, HBV DNA less than 15 and detected, hepatitis B surface antigen positive\par \par Blood tests April 29 2015 HBV DNA not detected, hepatitis B E antigen positive, a.l. 329, creatinine 1.06,  CBC with platelets unremarkable \par \par Sonogram April 30, 2015 shows no lesions in the liver\par \par Sonogram October 13, 2014 reveals hepatomegaly and no lesions. Blood test September 27, 2014 HBV DNA not detected, E antigen positive, alpha-fetoprotein 1.8, ALT 45, creatinine 0.3, platelet count [id number]\par \par blood tests from April 18, 2014 show an ALT of 42, creatinine 0.87, platelet count 229,000, hemoglobin 14.9, alpha-fetoprotein 1.9, HBV DNA less than 15 and detectable. An abdominal sonogram from April 18, 2014 showed no lesions in the liver

## 2023-03-29 NOTE — ASSESSMENT
[FreeTextEntry1] : Mr. HONEYCUTT is a 42 year old man with chronic hepatitis B, who saw Dr. Herndon until 2022.\par \par - hepatitis B: on tenofovir since ~2010, LFTs were reportedly elevated, switched to tenofovir TAF in 2021. \par - LFTs normal\par - HCC screening: US 3/2023 normal exam.\par \par - transient rectal bleed, likely hemorrhoidal in 2021. Had hemorrhoids on exam 9/2022, but given chronicity, colonoscopy is warranted.\par \par - BMI elevated purely due to muscles\par \par Plan:\par - repeat bloodwork and US before next visit in 6 months\par - bloodwork today\par - ongoing hematochezia, intermittent: colonoscopy

## 2023-07-28 RX ORDER — TENOFOVIR DISOPROXIL FUMARATE 300 MG/1
300 TABLET ORAL DAILY
Qty: 90 | Refills: 2 | Status: DISCONTINUED | COMMUNITY
Start: 2023-07-27 | End: 2023-07-28

## 2024-01-08 ENCOUNTER — APPOINTMENT (OUTPATIENT)
Dept: CARDIOLOGY | Facility: CLINIC | Age: 44
End: 2024-01-08
Payer: COMMERCIAL

## 2024-01-08 ENCOUNTER — NON-APPOINTMENT (OUTPATIENT)
Age: 44
End: 2024-01-08

## 2024-01-08 VITALS
SYSTOLIC BLOOD PRESSURE: 134 MMHG | BODY MASS INDEX: 28.56 KG/M2 | HEART RATE: 94 BPM | OXYGEN SATURATION: 97 % | DIASTOLIC BLOOD PRESSURE: 82 MMHG | HEIGHT: 71 IN | WEIGHT: 204 LBS

## 2024-01-08 DIAGNOSIS — Z82.49 FAMILY HISTORY OF ISCHEMIC HEART DISEASE AND OTHER DISEASES OF THE CIRCULATORY SYSTEM: ICD-10-CM

## 2024-01-08 DIAGNOSIS — B18.1 CHRONIC VIRAL HEPATITIS B W/OUT DELTA-AGENT: ICD-10-CM

## 2024-01-08 DIAGNOSIS — Z13.6 ENCOUNTER FOR SCREENING FOR CARDIOVASCULAR DISORDERS: ICD-10-CM

## 2024-01-08 PROCEDURE — 99203 OFFICE O/P NEW LOW 30 MIN: CPT | Mod: 25

## 2024-01-08 PROCEDURE — 93000 ELECTROCARDIOGRAM COMPLETE: CPT

## 2024-01-08 RX ORDER — SODIUM SULFATE, POTASSIUM SULFATE AND MAGNESIUM SULFATE 1.6; 3.13; 17.5 G/177ML; G/177ML; G/177ML
17.5-3.13-1.6 SOLUTION ORAL
Qty: 1 | Refills: 0 | Status: DISCONTINUED | COMMUNITY
Start: 2023-03-29 | End: 2024-01-08

## 2024-01-08 RX ORDER — MULTIVITAMIN
TABLET ORAL
Refills: 0 | Status: ACTIVE | COMMUNITY

## 2024-01-08 NOTE — DISCUSSION/SUMMARY
[FreeTextEntry1] : Yeison is a 43yoM PMH chronic Hep B, former smoker (quit 15 yrs ago, smoked x 8 years) who presents today for CV workup  Yeison's brother was recently diagnosed with an NSTEMI at [facility]. Their father also had an MI at an early age (<50).   Yeison has an excellent exercise tolerance and bikes about 10 miles during the spring/summer months and weight lifts during the winter months. He has no symptoms with any exertion or symptoms at rest.   His EKG shows NSR without ST changes.   We will proceed with a lipid panel, direct LDL, lipoprotein (a), hs-CRP, BMP at this time. If his lipids are elevated, will then proceed with a CT calcium score.  Once his lipids return as well, will likely proceed with carotid u/s as well to assess for any atherosclerosis and benefit for lipid lowering agents given his significant early CAD in the family.   Diet and exercise discussed at length.  Referral to PCP sent.  To proceed with a C-scope as well.   To follow up in 6 months.  [EKG obtained to assist in diagnosis and management of assessed problem(s)] : EKG obtained to assist in diagnosis and management of assessed problem(s)

## 2024-01-08 NOTE — HISTORY OF PRESENT ILLNESS
[FreeTextEntry1] : Yeison is a 43yoM PMH chronic Hep B, former smoker (quit 15 yrs ago, smoked x 8 years) who presents today for CV workup  His brother was recently diagnosed with an MI at [facility] and their father had an MI at a young age as well  Does a lot of weight training, lifting heavy weights, not as much cardio during the winter During the spring and summer, he does a lot of biking, swimming and feels very well when doing so.  Denies any chest pain or SOB.   Eats bread, toast, oatmeal, fruit for breakfast Has sandwiches for lunch Dinner is chicken and fish, not too much red meat. Eat a lot of leafy greens. A lot of rice.  Potatoe chips and sweets for late night snacking.

## 2024-01-16 ENCOUNTER — OUTPATIENT (OUTPATIENT)
Dept: OUTPATIENT SERVICES | Facility: HOSPITAL | Age: 44
LOS: 1 days | End: 2024-01-16
Payer: COMMERCIAL

## 2024-01-16 ENCOUNTER — APPOINTMENT (OUTPATIENT)
Dept: ULTRASOUND IMAGING | Facility: CLINIC | Age: 44
End: 2024-01-16
Payer: COMMERCIAL

## 2024-01-16 DIAGNOSIS — Z00.8 ENCOUNTER FOR OTHER GENERAL EXAMINATION: ICD-10-CM

## 2024-01-16 DIAGNOSIS — K76.0 FATTY (CHANGE OF) LIVER, NOT ELSEWHERE CLASSIFIED: ICD-10-CM

## 2024-01-16 LAB
AFP-TM SERPL-MCNC: 1.9 NG/ML
ALBUMIN SERPL ELPH-MCNC: 4.9 G/DL
ALP BLD-CCNC: 68 U/L
ALT SERPL-CCNC: 27 U/L
ANION GAP SERPL CALC-SCNC: 11 MMOL/L
AST SERPL-CCNC: 20 U/L
BILIRUB SERPL-MCNC: 0.4 MG/DL
BUN SERPL-MCNC: 15 MG/DL
CALCIUM SERPL-MCNC: 10 MG/DL
CHLORIDE SERPL-SCNC: 100 MMOL/L
CO2 SERPL-SCNC: 25 MMOL/L
CREAT SERPL-MCNC: 1.06 MG/DL
EGFR: 88 ML/MIN/1.73M2
GLUCOSE SERPL-MCNC: 86 MG/DL
POTASSIUM SERPL-SCNC: 4.6 MMOL/L
PROT SERPL-MCNC: 7.6 G/DL
SODIUM SERPL-SCNC: 136 MMOL/L

## 2024-01-16 PROCEDURE — 76700 US EXAM ABDOM COMPLETE: CPT

## 2024-01-16 PROCEDURE — 76700 US EXAM ABDOM COMPLETE: CPT | Mod: 26

## 2024-01-17 LAB
HBV DNA # SERPL NAA+PROBE: <10 IU/ML
HEPB DNA PCR INT: DETECTED
HEPB DNA PCR LOG: <1 LOGIU/ML

## 2024-01-24 ENCOUNTER — NON-APPOINTMENT (OUTPATIENT)
Age: 44
End: 2024-01-24

## 2024-01-24 DIAGNOSIS — E78.01 FAMILIAL HYPERCHOLESTEROLEMIA: ICD-10-CM

## 2024-01-24 LAB
ANION GAP SERPL CALC-SCNC: 11 MMOL/L
APO B SERPL-MCNC: 146 MG/DL
APO LP(A) SERPL-MCNC: 60.3 NMOL/L
BUN SERPL-MCNC: 15 MG/DL
CALCIUM SERPL-MCNC: 9.9 MG/DL
CHLORIDE SERPL-SCNC: 101 MMOL/L
CHOLEST SERPL-MCNC: 264 MG/DL
CO2 SERPL-SCNC: 25 MMOL/L
CREAT SERPL-MCNC: 1.1 MG/DL
CRP SERPL HS-MCNC: 0.44 MG/L
EGFR: 84 ML/MIN/1.73M2
ESTIMATED AVERAGE GLUCOSE: 114 MG/DL
GLUCOSE SERPL-MCNC: 94 MG/DL
HBA1C MFR BLD HPLC: 5.6 %
HCT VFR BLD CALC: 46.2 %
HDLC SERPL-MCNC: 48 MG/DL
HGB BLD-MCNC: 16 G/DL
LDLC SERPL CALC-MCNC: 174 MG/DL
LDLC SERPL DIRECT ASSAY-MCNC: 185 MG/DL
MCHC RBC-ENTMCNC: 30.4 PG
MCHC RBC-ENTMCNC: 34.6 GM/DL
MCV RBC AUTO: 87.8 FL
NONHDLC SERPL-MCNC: 216 MG/DL
PLATELET # BLD AUTO: 256 K/UL
POTASSIUM SERPL-SCNC: 4.5 MMOL/L
RBC # BLD: 5.26 M/UL
RBC # FLD: 11.9 %
SODIUM SERPL-SCNC: 137 MMOL/L
TRIGL SERPL-MCNC: 226 MG/DL
WBC # FLD AUTO: 7.65 K/UL

## 2024-01-24 RX ORDER — ATORVASTATIN CALCIUM 40 MG/1
40 TABLET, FILM COATED ORAL
Qty: 90 | Refills: 3 | Status: ACTIVE | COMMUNITY
Start: 2024-01-24 | End: 1900-01-01

## 2024-01-31 ENCOUNTER — APPOINTMENT (OUTPATIENT)
Dept: CT IMAGING | Facility: CLINIC | Age: 44
End: 2024-01-31
Payer: COMMERCIAL

## 2024-01-31 DIAGNOSIS — I25.10 ATHEROSCLEROTIC HEART DISEASE OF NATIVE CORONARY ARTERY W/OUT ANGINA PECTORIS: ICD-10-CM

## 2024-01-31 DIAGNOSIS — I25.84 ATHEROSCLEROTIC HEART DISEASE OF NATIVE CORONARY ARTERY W/OUT ANGINA PECTORIS: ICD-10-CM

## 2024-01-31 PROCEDURE — 75571 CT HRT W/O DYE W/CA TEST: CPT

## 2024-02-13 ENCOUNTER — APPOINTMENT (OUTPATIENT)
Dept: HEPATOLOGY | Facility: CLINIC | Age: 44
End: 2024-02-13

## 2024-02-28 RX ORDER — TENOFOVIR ALAFENAMIDE 25 MG/1
25 TABLET ORAL
Qty: 90 | Refills: 2 | Status: ACTIVE | COMMUNITY
Start: 2021-02-12 | End: 1900-01-01

## 2024-03-04 ENCOUNTER — APPOINTMENT (OUTPATIENT)
Dept: HEPATOLOGY | Facility: CLINIC | Age: 44
End: 2024-03-04
Payer: COMMERCIAL

## 2024-03-04 VITALS
SYSTOLIC BLOOD PRESSURE: 123 MMHG | RESPIRATION RATE: 14 BRPM | BODY MASS INDEX: 28.14 KG/M2 | HEART RATE: 97 BPM | DIASTOLIC BLOOD PRESSURE: 81 MMHG | WEIGHT: 201 LBS | OXYGEN SATURATION: 95 % | TEMPERATURE: 96.3 F | HEIGHT: 71 IN

## 2024-03-04 DIAGNOSIS — K76.0 FATTY (CHANGE OF) LIVER, NOT ELSEWHERE CLASSIFIED: ICD-10-CM

## 2024-03-04 DIAGNOSIS — B18.1 CHRONIC VIRAL HEPATITIS B W/OUT DELTA-AGENT: ICD-10-CM

## 2024-03-04 DIAGNOSIS — K62.5 HEMORRHAGE OF ANUS AND RECTUM: ICD-10-CM

## 2024-03-04 DIAGNOSIS — E66.3 OVERWEIGHT: ICD-10-CM

## 2024-03-04 PROCEDURE — 99214 OFFICE O/P EST MOD 30 MIN: CPT

## 2024-03-04 PROCEDURE — G2211 COMPLEX E/M VISIT ADD ON: CPT

## 2024-03-04 RX ORDER — SODIUM SULFATE, POTASSIUM SULFATE AND MAGNESIUM SULFATE 1.6; 3.13; 17.5 G/177ML; G/177ML; G/177ML
17.5-3.13-1.6 SOLUTION ORAL
Qty: 1 | Refills: 0 | Status: ACTIVE | COMMUNITY
Start: 2024-03-04 | End: 1900-01-01

## 2024-03-04 NOTE — HISTORY OF PRESENT ILLNESS
[de-identified] : 3/4/24: weight 4 lbs heavier. Had bloodwork and US abdomen as below. Colonoscopy not done. Still has intermittent blood on toilet paper and feels inflammation. Happens when he has hard stool. Exam: 201 lbs, BMi 28. Labs 1/16/24: normal: CBC with Hb 15.9, ; BMP, LFTs with ALT 27, HbA1c 5.6%. Abnormal: HBV DNA detected <10 IU/mL; , cholesterol 264.  - 3/29/23: had bloodwork and US abdomen. Weight stable. Continues to have red blood on toilet paper on and off.   - 9/30/22: first visit with me. Had US. Feels well, is taking Vemlidy. Is otherwise healthy. Had red blood on toilet paper before last visit, 2 or 3 times, while stool was brown. Felt anal swelling. Resolved. Weight stable. Did not have constipation  SHx: acounting in a bank   Workup: - 3/4/24 US abdomen: no focal liver lesion. Minimally increased hepatic echotexture - mild steatosis. - 3/20/23 US abdomen: normal exam.  - 9/07/22 US abdomen: normal exam - 9/15/21 fibroscan: 6.4 kPa, 265 dB/m - F0-1, S2-3. No/mild fibrosis; steatosis - 6/26/17 fibroscan: 5.3 kPa ,269 dB/m - F1, S2 - 7/9/15 fibroscan: F1 fibrosis

## 2024-09-05 ENCOUNTER — APPOINTMENT (OUTPATIENT)
Dept: CARDIOLOGY | Facility: CLINIC | Age: 44
End: 2024-09-05
Payer: COMMERCIAL

## 2024-09-05 VITALS
OXYGEN SATURATION: 96 % | DIASTOLIC BLOOD PRESSURE: 88 MMHG | BODY MASS INDEX: 26.88 KG/M2 | WEIGHT: 192 LBS | HEART RATE: 85 BPM | SYSTOLIC BLOOD PRESSURE: 142 MMHG | HEIGHT: 71 IN

## 2024-09-05 VITALS — DIASTOLIC BLOOD PRESSURE: 79 MMHG | SYSTOLIC BLOOD PRESSURE: 131 MMHG

## 2024-09-05 DIAGNOSIS — I25.10 ATHEROSCLEROTIC HEART DISEASE OF NATIVE CORONARY ARTERY W/OUT ANGINA PECTORIS: ICD-10-CM

## 2024-09-05 DIAGNOSIS — E78.01 FAMILIAL HYPERCHOLESTEROLEMIA: ICD-10-CM

## 2024-09-05 PROCEDURE — 93000 ELECTROCARDIOGRAM COMPLETE: CPT

## 2024-09-05 PROCEDURE — 99214 OFFICE O/P EST MOD 30 MIN: CPT | Mod: 25

## 2024-09-05 PROCEDURE — G2211 COMPLEX E/M VISIT ADD ON: CPT | Mod: NC

## 2024-09-05 NOTE — DISCUSSION/SUMMARY
[FreeTextEntry1] : Yeison is a 43yoM PMH chronic Hep B, former smoker (quit 15 yrs ago, smoked x 8 years), HLD who presents for follow up  He is doing very well from a cardiac standpoint. He denies any symptoms at this time.  His lipids have markedly improved on Lipitor 40mg and his LDL c is 86. Ideally, his LDL should be <70 given a CAC of 10 but he would like to proceed with diet and exercise which is entirely reasonable. Additionally. given his hepatitis he is concerned about being on max dose of Lipitor.   A TTE will be done as well given his CAC. He will follow up in 6 months.    [EKG obtained to assist in diagnosis and management of assessed problem(s)] : EKG obtained to assist in diagnosis and management of assessed problem(s)

## 2024-09-05 NOTE — HISTORY OF PRESENT ILLNESS
[FreeTextEntry1] : Yeison is a 43yoM PMH chronic Hep B, former smoker (quit 15 yrs ago, smoked x 8 years), HLD who presents for follow up  He was last seen in January for initial evaluation. He was doing very well. A CT calcium score and lipid panel were ordered. He was placed on Lipitor 40mg.  He has increased his exercise and has improved his diet.  Decreased his red meat intake and alcohol intake.  Has been eating more fruits and veggies  Lipids 1/2024:   HDL 48 LDL c 174  Repeat 9/4/24:   HDL 50 LDL c 86  He has been feeling well and has been exercising and denies any chest pain or SOB

## 2024-09-05 NOTE — CARDIOLOGY SUMMARY
[TextEntry] : EC24: NSR, no ST changes EKG 24: NSR  CT calcium score:  NONCARDIAC FINDINGS: Imaged lungs, abdomen, and osseous structures unremarkable. Mild bilateral gynecomastia. IMPRESSION: The calculated Agatston score is 10.

## 2024-09-06 RX ORDER — ATORVASTATIN CALCIUM 40 MG/1
40 TABLET, FILM COATED ORAL
Qty: 90 | Refills: 3 | Status: ACTIVE | COMMUNITY
Start: 2024-09-06 | End: 1900-01-01

## 2024-09-09 ENCOUNTER — APPOINTMENT (OUTPATIENT)
Dept: HEPATOLOGY | Facility: CLINIC | Age: 44
End: 2024-09-09

## 2024-10-21 ENCOUNTER — OUTPATIENT (OUTPATIENT)
Dept: OUTPATIENT SERVICES | Facility: HOSPITAL | Age: 44
LOS: 1 days | End: 2024-10-21
Payer: COMMERCIAL

## 2024-10-21 ENCOUNTER — APPOINTMENT (OUTPATIENT)
Dept: ULTRASOUND IMAGING | Facility: CLINIC | Age: 44
End: 2024-10-21
Payer: COMMERCIAL

## 2024-10-21 DIAGNOSIS — K76.0 FATTY (CHANGE OF) LIVER, NOT ELSEWHERE CLASSIFIED: ICD-10-CM

## 2024-10-21 PROCEDURE — 76700 US EXAM ABDOM COMPLETE: CPT

## 2024-10-21 PROCEDURE — 76700 US EXAM ABDOM COMPLETE: CPT | Mod: 26

## 2024-10-29 ENCOUNTER — APPOINTMENT (OUTPATIENT)
Dept: HEPATOLOGY | Facility: CLINIC | Age: 44
End: 2024-10-29
Payer: COMMERCIAL

## 2024-10-29 VITALS
TEMPERATURE: 97.4 F | SYSTOLIC BLOOD PRESSURE: 104 MMHG | HEIGHT: 71 IN | RESPIRATION RATE: 16 BRPM | BODY MASS INDEX: 26.88 KG/M2 | OXYGEN SATURATION: 98 % | WEIGHT: 192 LBS | HEART RATE: 76 BPM | DIASTOLIC BLOOD PRESSURE: 67 MMHG

## 2024-10-29 DIAGNOSIS — Z12.11 ENCOUNTER FOR SCREENING FOR MALIGNANT NEOPLASM OF COLON: ICD-10-CM

## 2024-10-29 DIAGNOSIS — K92.1 MELENA: ICD-10-CM

## 2024-10-29 DIAGNOSIS — B18.1 CHRONIC VIRAL HEPATITIS B W/OUT DELTA-AGENT: ICD-10-CM

## 2024-10-29 DIAGNOSIS — E66.3 OVERWEIGHT: ICD-10-CM

## 2024-10-29 PROCEDURE — 76981 USE PARENCHYMA: CPT

## 2024-10-29 PROCEDURE — G2211 COMPLEX E/M VISIT ADD ON: CPT | Mod: NC

## 2024-10-29 PROCEDURE — 99214 OFFICE O/P EST MOD 30 MIN: CPT

## 2024-10-29 RX ORDER — SODIUM SULFATE, POTASSIUM SULFATE AND MAGNESIUM SULFATE 1.6; 3.13; 17.5 G/177ML; G/177ML; G/177ML
17.5-3.13-1.6 SOLUTION ORAL
Qty: 1 | Refills: 0 | Status: ACTIVE | COMMUNITY
Start: 2024-10-29 | End: 1900-01-01

## 2024-10-31 DIAGNOSIS — K76.0 FATTY (CHANGE OF) LIVER, NOT ELSEWHERE CLASSIFIED: ICD-10-CM

## 2025-03-07 ENCOUNTER — NON-APPOINTMENT (OUTPATIENT)
Age: 45
End: 2025-03-07

## 2025-03-07 ENCOUNTER — APPOINTMENT (OUTPATIENT)
Dept: CARDIOLOGY | Facility: CLINIC | Age: 45
End: 2025-03-07
Payer: COMMERCIAL

## 2025-03-07 VITALS
BODY MASS INDEX: 26.88 KG/M2 | HEART RATE: 82 BPM | DIASTOLIC BLOOD PRESSURE: 70 MMHG | HEIGHT: 71 IN | WEIGHT: 192 LBS | OXYGEN SATURATION: 98 % | SYSTOLIC BLOOD PRESSURE: 116 MMHG

## 2025-03-07 DIAGNOSIS — I25.10 ATHEROSCLEROTIC HEART DISEASE OF NATIVE CORONARY ARTERY W/OUT ANGINA PECTORIS: ICD-10-CM

## 2025-03-07 DIAGNOSIS — R73.03 PREDIABETES.: ICD-10-CM

## 2025-03-07 DIAGNOSIS — E78.01 FAMILIAL HYPERCHOLESTEROLEMIA: ICD-10-CM

## 2025-03-07 PROCEDURE — 99214 OFFICE O/P EST MOD 30 MIN: CPT

## 2025-03-07 PROCEDURE — G2211 COMPLEX E/M VISIT ADD ON: CPT | Mod: NC

## 2025-03-07 PROCEDURE — 93306 TTE W/DOPPLER COMPLETE: CPT

## 2025-03-07 PROCEDURE — 93000 ELECTROCARDIOGRAM COMPLETE: CPT

## 2025-04-21 ENCOUNTER — APPOINTMENT (OUTPATIENT)
Dept: HEPATOLOGY | Facility: CLINIC | Age: 45
End: 2025-04-21

## 2025-07-08 ENCOUNTER — APPOINTMENT (OUTPATIENT)
Dept: ULTRASOUND IMAGING | Facility: CLINIC | Age: 45
End: 2025-07-08
Payer: COMMERCIAL

## 2025-07-08 ENCOUNTER — OUTPATIENT (OUTPATIENT)
Dept: OUTPATIENT SERVICES | Facility: HOSPITAL | Age: 45
LOS: 1 days | End: 2025-07-08
Payer: COMMERCIAL

## 2025-07-08 DIAGNOSIS — E66.3 OVERWEIGHT: ICD-10-CM

## 2025-07-08 DIAGNOSIS — B18.1 CHRONIC VIRAL HEPATITIS B WITHOUT DELTA-AGENT: ICD-10-CM

## 2025-07-08 PROCEDURE — 76700 US EXAM ABDOM COMPLETE: CPT | Mod: 26

## 2025-07-08 PROCEDURE — 76700 US EXAM ABDOM COMPLETE: CPT

## 2025-07-14 ENCOUNTER — APPOINTMENT (OUTPATIENT)
Dept: HEPATOLOGY | Facility: CLINIC | Age: 45
End: 2025-07-14

## 2025-07-25 ENCOUNTER — RX RENEWAL (OUTPATIENT)
Age: 45
End: 2025-07-25

## 2025-09-18 ENCOUNTER — APPOINTMENT (OUTPATIENT)
Dept: CARDIOLOGY | Facility: CLINIC | Age: 45
End: 2025-09-18
Payer: COMMERCIAL

## 2025-09-18 VITALS
OXYGEN SATURATION: 96 % | WEIGHT: 192 LBS | HEIGHT: 71 IN | BODY MASS INDEX: 26.88 KG/M2 | DIASTOLIC BLOOD PRESSURE: 78 MMHG | HEART RATE: 103 BPM | SYSTOLIC BLOOD PRESSURE: 114 MMHG

## 2025-09-18 DIAGNOSIS — I25.10 ATHEROSCLEROTIC HEART DISEASE OF NATIVE CORONARY ARTERY W/OUT ANGINA PECTORIS: ICD-10-CM

## 2025-09-18 DIAGNOSIS — K76.0 FATTY (CHANGE OF) LIVER, NOT ELSEWHERE CLASSIFIED: ICD-10-CM

## 2025-09-18 DIAGNOSIS — E78.01 FAMILIAL HYPERCHOLESTEROLEMIA: ICD-10-CM

## 2025-09-18 DIAGNOSIS — R73.03 PREDIABETES.: ICD-10-CM

## 2025-09-18 PROCEDURE — G2211 COMPLEX E/M VISIT ADD ON: CPT | Mod: NC

## 2025-09-18 PROCEDURE — 99214 OFFICE O/P EST MOD 30 MIN: CPT

## 2025-09-18 PROCEDURE — 93000 ELECTROCARDIOGRAM COMPLETE: CPT
